# Patient Record
Sex: MALE | Race: WHITE | NOT HISPANIC OR LATINO | Employment: FULL TIME | URBAN - METROPOLITAN AREA
[De-identification: names, ages, dates, MRNs, and addresses within clinical notes are randomized per-mention and may not be internally consistent; named-entity substitution may affect disease eponyms.]

---

## 2018-11-26 ENCOUNTER — OFFICE VISIT - HEALTHEAST (OUTPATIENT)
Dept: FAMILY MEDICINE | Facility: CLINIC | Age: 33
End: 2018-11-26

## 2018-11-26 DIAGNOSIS — Z00.00 ROUTINE GENERAL MEDICAL EXAMINATION AT A HEALTH CARE FACILITY: ICD-10-CM

## 2018-11-26 LAB
ALBUMIN SERPL-MCNC: 4.6 G/DL (ref 3.5–5)
ALP SERPL-CCNC: 64 U/L (ref 45–120)
ALT SERPL W P-5'-P-CCNC: 32 U/L (ref 0–45)
ANION GAP SERPL CALCULATED.3IONS-SCNC: 12 MMOL/L (ref 5–18)
AST SERPL W P-5'-P-CCNC: 29 U/L (ref 0–40)
BILIRUB SERPL-MCNC: 0.9 MG/DL (ref 0–1)
BUN SERPL-MCNC: 11 MG/DL (ref 8–22)
CALCIUM SERPL-MCNC: 10 MG/DL (ref 8.5–10.5)
CHLORIDE BLD-SCNC: 102 MMOL/L (ref 98–107)
CHOLEST SERPL-MCNC: 179 MG/DL
CO2 SERPL-SCNC: 27 MMOL/L (ref 22–31)
CREAT SERPL-MCNC: 0.84 MG/DL (ref 0.7–1.3)
ERYTHROCYTE [DISTWIDTH] IN BLOOD BY AUTOMATED COUNT: 11.1 % (ref 11–14.5)
FASTING STATUS PATIENT QL REPORTED: YES
GFR SERPL CREATININE-BSD FRML MDRD: >60 ML/MIN/1.73M2
GLUCOSE BLD-MCNC: 81 MG/DL (ref 70–125)
HCT VFR BLD AUTO: 44 % (ref 40–54)
HDLC SERPL-MCNC: 51 MG/DL
HGB BLD-MCNC: 14.9 G/DL (ref 14–18)
LDLC SERPL CALC-MCNC: 115 MG/DL
MCH RBC QN AUTO: 30.3 PG (ref 27–34)
MCHC RBC AUTO-ENTMCNC: 33.9 G/DL (ref 32–36)
MCV RBC AUTO: 89 FL (ref 80–100)
PLATELET # BLD AUTO: 228 THOU/UL (ref 140–440)
PMV BLD AUTO: 7.6 FL (ref 7–10)
POTASSIUM BLD-SCNC: 3.7 MMOL/L (ref 3.5–5)
PROT SERPL-MCNC: 7.8 G/DL (ref 6–8)
RBC # BLD AUTO: 4.92 MILL/UL (ref 4.4–6.2)
SODIUM SERPL-SCNC: 141 MMOL/L (ref 136–145)
TRIGL SERPL-MCNC: 67 MG/DL
WBC: 7.5 THOU/UL (ref 4–11)

## 2018-11-26 ASSESSMENT — MIFFLIN-ST. JEOR: SCORE: 1662.67

## 2018-11-27 LAB
HBV SURFACE AB SERPL IA-ACNC: POSITIVE M[IU]/ML
RUBV IGG SERPL QL IA: POSITIVE

## 2018-11-28 LAB
GAMMA INTERFERON BACKGROUND BLD IA-ACNC: 0.06 IU/ML
M TB IFN-G BLD-IMP: NEGATIVE
MEV IGG SER IA-ACNC: POSITIVE
MITOGEN IGNF BCKGRD COR BLD-ACNC: -0.01 IU/ML
MITOGEN IGNF BCKGRD COR BLD-ACNC: -0.01 IU/ML
MUV IGG SER QL IA: POSITIVE
QTF INTERPRETATION: NORMAL
QTF MITOGEN - NIL: 9.13 IU/ML
VZV IGG SER QL IA: POSITIVE

## 2018-12-13 ENCOUNTER — RECORDS - HEALTHEAST (OUTPATIENT)
Dept: ADMINISTRATIVE | Facility: OTHER | Age: 33
End: 2018-12-13

## 2019-11-04 ENCOUNTER — RECORDS - HEALTHEAST (OUTPATIENT)
Dept: ADMINISTRATIVE | Facility: OTHER | Age: 34
End: 2019-11-04

## 2021-03-02 ENCOUNTER — OFFICE VISIT - HEALTHEAST (OUTPATIENT)
Dept: FAMILY MEDICINE | Facility: CLINIC | Age: 36
End: 2021-03-02

## 2021-03-02 DIAGNOSIS — Z00.00 ROUTINE GENERAL MEDICAL EXAMINATION AT A HEALTH CARE FACILITY: ICD-10-CM

## 2021-03-02 LAB
ERYTHROCYTE [DISTWIDTH] IN BLOOD BY AUTOMATED COUNT: 12 % (ref 11–14.5)
HCT VFR BLD AUTO: 45.3 % (ref 40–54)
HGB BLD-MCNC: 15.7 G/DL (ref 14–18)
MCH RBC QN AUTO: 29.9 PG (ref 27–34)
MCHC RBC AUTO-ENTMCNC: 34.7 G/DL (ref 32–36)
MCV RBC AUTO: 86 FL (ref 80–100)
PLATELET # BLD AUTO: 229 THOU/UL (ref 140–440)
PMV BLD AUTO: 9.7 FL (ref 7–10)
RBC # BLD AUTO: 5.25 MILL/UL (ref 4.4–6.2)
WBC: 7.7 THOU/UL (ref 4–11)

## 2021-03-02 ASSESSMENT — MIFFLIN-ST. JEOR: SCORE: 1658.14

## 2021-03-03 LAB
ALBUMIN SERPL-MCNC: 5 G/DL (ref 3.5–5)
ALP SERPL-CCNC: 54 U/L (ref 45–120)
ALT SERPL W P-5'-P-CCNC: 24 U/L (ref 0–45)
ANION GAP SERPL CALCULATED.3IONS-SCNC: 12 MMOL/L (ref 5–18)
AST SERPL W P-5'-P-CCNC: 25 U/L (ref 0–40)
BILIRUB SERPL-MCNC: 1 MG/DL (ref 0–1)
BUN SERPL-MCNC: 13 MG/DL (ref 8–22)
CALCIUM SERPL-MCNC: 9.4 MG/DL (ref 8.5–10.5)
CHLORIDE BLD-SCNC: 103 MMOL/L (ref 98–107)
CHOLEST SERPL-MCNC: 173 MG/DL
CO2 SERPL-SCNC: 25 MMOL/L (ref 22–31)
CREAT SERPL-MCNC: 0.89 MG/DL (ref 0.7–1.3)
FASTING STATUS PATIENT QL REPORTED: YES
GFR SERPL CREATININE-BSD FRML MDRD: >60 ML/MIN/1.73M2
GLUCOSE BLD-MCNC: 82 MG/DL (ref 70–125)
HDLC SERPL-MCNC: 55 MG/DL
LDLC SERPL CALC-MCNC: 105 MG/DL
POTASSIUM BLD-SCNC: 3.7 MMOL/L (ref 3.5–5)
PROT SERPL-MCNC: 8.2 G/DL (ref 6–8)
SODIUM SERPL-SCNC: 140 MMOL/L (ref 136–145)
TRIGL SERPL-MCNC: 65 MG/DL

## 2021-03-05 LAB
GAMMA INTERFERON BACKGROUND BLD IA-ACNC: 0.09 IU/ML
M TB IFN-G BLD-IMP: NEGATIVE
MITOGEN IGNF BCKGRD COR BLD-ACNC: -0.01 IU/ML
MITOGEN IGNF BCKGRD COR BLD-ACNC: 0.03 IU/ML
QTF INTERPRETATION: NORMAL
QTF MITOGEN - NIL: 7.92 IU/ML

## 2021-05-29 ENCOUNTER — RECORDS - HEALTHEAST (OUTPATIENT)
Dept: ADMINISTRATIVE | Facility: CLINIC | Age: 36
End: 2021-05-29

## 2021-06-02 VITALS — HEIGHT: 70 IN | WEIGHT: 163 LBS | BODY MASS INDEX: 23.34 KG/M2

## 2021-06-05 VITALS
SYSTOLIC BLOOD PRESSURE: 139 MMHG | WEIGHT: 162 LBS | HEIGHT: 70 IN | RESPIRATION RATE: 16 BRPM | DIASTOLIC BLOOD PRESSURE: 82 MMHG | HEART RATE: 82 BPM | BODY MASS INDEX: 23.19 KG/M2

## 2021-06-09 ENCOUNTER — COMMUNICATION - HEALTHEAST (OUTPATIENT)
Dept: FAMILY MEDICINE | Facility: CLINIC | Age: 36
End: 2021-06-09
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
## 2021-06-15 NOTE — PROGRESS NOTES
Assessment/Plan   1. Routine general medical examination at a health care facility  Minesh presents for his annual exam.  He is overall very healthy without major medical problems.  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  We will do fasting blood work.  Referral number given for Dr. Loving at the [facility] for vasectomy consult.  His father was diagnosed with colon cancer at age 62.  Would recommend begin screening at age 43.  - QTF-Mycobacterium tuberculosis by QuantiFERON-TB Gold Plus  - Comprehensive Metabolic Panel  - HM2(CBC w/o Differential)  - Lipid Cascade    Subjective:      Elier Huff is a 35 y.o. male who presents for an annual exam.  Overall, he states he is doing well.  It has been a couple years since I have seen him.  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  He now has 3 young children.  He is getting plenty of exercise.  Family history is updated with his father being diagnosed with colon cancer at age 62.  He is wanting to pursue a vasectomy.    Healthy Habits:   Regular Exercise: Yes  Sunscreen Use: Yes  Healthy Diet: Yes  Dental Visits Regularly: Yes  Seat Belt: Yes  Sexually active: Yes  Monthly Self Testicular Exams:  Yes  Hemoccults: No  Flex Sig: No  Colonoscopy: No  Lipid Profile: Yes  Glucose Screen: Yes  Prevention of Osteoporosis: Yes  Last Dexa: No      Immunization History   Administered Date(s) Administered     COVID-19,PF,Pfizer 01/13/2021, 01/13/2021, 02/03/2021     Influenza, inj, historic,unspecified 11/05/2014, 10/21/2015     Influenza,seasonal,quad inj =/> 6months 11/26/2018     Tdap 06/16/2011, 03/02/2021     Immunization status: due today.    No exam data present    Current Outpatient Medications   Medication Sig Dispense Refill     cholecalciferol, vitamin D3, (VITAMIN D3 ORAL) Take by mouth.       multivitamin capsule Take 1 capsule by mouth daily.       No current facility-administered medications for this visit.      No  past medical history on file.  Past Surgical History:   Procedure Laterality Date     [place] DENT[place] SURGERY PROCEDURE      Description: Oral Surgery Tooth Extraction;  Recorded: 11/26/2014;  Comments: wisdom teeth     Patient has no known allergies.  Family History   Problem Relation Age of Onset     Hypertension Brother      Social History     Socioeconomic History     Marital status:      Spouse name: Not on file     Number of children: Not on file     Years of education: Not on file     Highest education level: Not on file   Occupational History     Not on file   Social Needs     Financial resource strain: Not on file     Food insecurity     Worry: Not on file     Inability: Not on file     Transportation needs     Medical: Not on file     Non-medical: Not on file   Tobacco Use     Smoking status: Never Smoker     Smokeless tobacco: Never Used   Substance and Sexual Activity     Alcohol use: Not on file     Drug use: Not on file     Sexual activity: Not on file   Lifestyle     Physical activity     Days per week: Not on file     Minutes per session: Not on file     Stress: Not on file   Relationships     Social connections     Talks on phone: Not on file     Gets together: Not on file     Attends Holiness service: Not on file     Active member of club or organization: Not on file     Attends meetings of clubs or organizations: Not on file     Relationship status: Not on file     Intimate partner violence     Fear of current or ex partner: Not on file     Emotionally abused: Not on file     Physically abused: Not on file     Forced sexual activity: Not on file   Other Topics Concern     Not on file   Social History Narrative     Not on file       Review of Systems  General:  Denies problem  Eyes: Denies problem  Ears/Nose/Throat: Denies problem  Cardiovascular: Denies problem  Respiratory:  Denies problem  Gastrointestinal:  Denies problem  Genitourinary: Denies problem  Musculoskeletal:  Denies problem  Skin:  "Denies problem  Neurologic: Denies problem  Psychiatric: Denies problem  Endocrine: Denies problem  Heme/Lymphatic: Denies problem   Allergic/Immunologic: Denies problem        Objective:     Vitals:    03/02/21 1711   BP: 139/82   Pulse: 82   Resp: 16   Weight: 162 lb (73.5 kg)   Height: 5' 9.5\" (1.765 m)     Body mass index is 23.58 kg/m .    Physical  General Appearance: Alert, cooperative, no distress, appears stated age  Head: Normocephalic, without obvious abnormality, atraumatic  Eyes: PERRL, conjunctiva/corneas clear, EOM's intact  Ears: Normal TM's and external ear canals, both ears  Nose: Nares normal, septum midline,mucosa normal, no drainage  Throat: Lips, mucosa, and tongue normal; teeth and gums normal  Neck: Supple, symmetrical, trachea midline, no adenopathy;  thyroid: not enlarged, symmetric, no tenderness/mass/nodules; no carotid bruit or JVD  Back: Symmetric, no curvature, ROM normal, no CVA tenderness  Lungs: Clear to auscultation bilaterally, respirations unlabored  Heart: Regular rate and rhythm, S1 and S2 normal, no murmur, rub, or gallop,  Abdomen: Soft, non-tender, bowel sounds active all four quadrants,  no masses, no organomegaly  Musculoskeletal: Normal range of motion. No joint swelling or deformity.   Extremities: Extremities normal, atraumatic, no cyanosis or edema  Skin: Skin color, texture, turgor normal, no rashes or lesions  Lymph nodes: Cervical, supraclavicular, and axillary nodes normal  Neurologic: He is alert. He has normal reflexes.   Psychiatric: He has a normal mood and affect.            "

## 2021-06-21 NOTE — PROGRESS NOTES
Assessment:   1. Routine general medical examination at a health care facility  As far as healthcare maintenance, he is up-to-date on his immunizations.  He would like to do fasting blood work today.  Flu shot is updated.  He needed titers done for his work credentialing.  Recommend next physical in 2 years.  - Hepatitis B Surface Antibody (Anti-HBs) Vaccine Check  - Mumps Antibody, IgG  - QTF-Mycobacterium tuberculosis by QuantiFERON-TB Gold Plus  - Rubella Antibody, IgG  - Rubeola Antibody, IgG  - Varicella Zoster Antibody, IgG  - Comprehensive Metabolic Panel  - HM2(CBC w/o Differential)  - Lipid Cascade  Subjective:      Elier Huff is a 33 y.o. male who presents for an annual exam.  Overall, he states he is feeling really well.  He does not have any physical complaints.  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  He is up-to-date on his tetanus.  We will update his flu shot today.  He tries to get regular exercise.  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  He has a 3-year-old daughter and a [id number] son.  They may be pregnant with another child soon.  He is a non-smoker.    Healthy Habits:   Regular Exercise: Yes  Sunscreen Use: Yes  Healthy Diet: Yes  Dental Visits Regularly: Yes  Seat Belt: Yes  Sexually active: Yes  Monthly Self Testicular Exams:  Yes  Hemoccults: No  Flex Sig: No  Colonoscopy: No  Lipid Profile: Yes  Glucose Screen: Yes  Prevention of Osteoporosis: Multi - vit  Last Dexa: No      Immunization History   Administered Date(s) Administered     Influenza, inj, historic,unspecified 11/05/2014, 10/21/2015     Tdap 06/16/2011     Immunization status: up to date and documented.    No exam data present     Current Outpatient Medications   Medication Sig Dispense Refill     multivitamin capsule Take 1 capsule by mouth daily.       No current facility-administered medications for this visit.      No past medical  "history on file.  Past Surgical History:   Procedure Laterality Date     [place] DENTAL SURGERY PROCEDURE      Description: Oral Surgery Tooth Extraction;  Recorded: 11/26/2014;  Comments: wisdom teeth     Patient has no known allergies.  Family History   Problem Relation Age of Onset     Hypertension Brother      Social History     Socioeconomic History     Marital status:      Spouse name: Not on file     Number of children: Not on file     Years of education: Not on file     Highest education level: Not on file   Social Needs     Financial resource strain: Not on file     Food insecurity - worry: Not on file     Food insecurity - inability: Not on file     Transportation needs - medical: Not on file     Transportation needs - non-medical: Not on file   Occupational History     Not on file   Tobacco Use     Smoking status: Never Smoker     Smokeless tobacco: Never Used   Substance and Sexual Activity     Alcohol use: Not on file     Drug use: Not on file     Sexual activity: Not on file   Other Topics Concern     Not on file   Social History Narrative     Not on file       Review of Systems  Review of Systems          Objective:     Vitals:    11/26/18 1409   BP: 122/80   Pulse: 68   Resp: 16   Temp: 97.7  F (36.5  C)   Weight: 163 lb (73.9 kg)   Height: 5' 9.5\" (1.765 m)     Body mass index is 23.73 kg/m .    Physical  Physical Exam      Physical Exam:  General Appearance: Alert, cooperative, no distress, appears stated age  Head: Normocephalic, without obvious abnormality, atraumatic  Eyes: PERRL, conjunctiva/corneas clear, EOM's intact  Ears: Normal TM's and external ear canals, both ears  Nose: Nares normal, septum midline,mucosa normal, no drainage  Throat: Lips, mucosa, and tongue normal; teeth and gums normal  Neck: Supple, symmetrical, trachea midline, no adenopathy; thyroid: not enlarged, symmetric, no tenderness/mass/nodules; no carotid bruit  Back: Symmetric, no curvature, ROM normal, no CVA " tenderness  Lungs: Clear to auscultation bilaterally, respirations unlabored  Heart: Regular rate and rhythm, S1 and S2 normal, no murmur, rub, or gallop, Abdomen: Soft, non-tender, bowel sounds active all four quadrants,  no masses, no organomegaly palpation.  No adnexal mass or tenderness.  : normal circumcised penis, testes descended bilaterally, no masses palpated, no hernias.  Extremities: Extremities normal, atraumatic, no cyanosis or edema  Skin: Skin color, texture, turgor normal, no rashes or lesions  Lymph nodes: Cervical, supraclavicular, and axillary nodes normal  Neurologic: Normal

## 2021-06-27 ENCOUNTER — HEALTH MAINTENANCE LETTER (OUTPATIENT)
Age: 36
End: 2021-06-27

## 2021-10-17 ENCOUNTER — HEALTH MAINTENANCE LETTER (OUTPATIENT)
Age: 36
End: 2021-10-17

## 2022-04-03 ENCOUNTER — HEALTH MAINTENANCE LETTER (OUTPATIENT)
Age: 37
End: 2022-04-03

## 2022-04-20 ENCOUNTER — OFFICE VISIT (OUTPATIENT)
Dept: FAMILY MEDICINE | Facility: CLINIC | Age: 37
End: 2022-04-20
Payer: COMMERCIAL

## 2022-04-20 VITALS
WEIGHT: 169 LBS | BODY MASS INDEX: 24.6 KG/M2 | HEART RATE: 82 BPM | OXYGEN SATURATION: 98 % | DIASTOLIC BLOOD PRESSURE: 78 MMHG | SYSTOLIC BLOOD PRESSURE: 120 MMHG

## 2022-04-20 DIAGNOSIS — Z30.09 ENCOUNTER FOR VASECTOMY COUNSELING: Primary | ICD-10-CM

## 2022-04-20 PROCEDURE — 99213 OFFICE O/P EST LOW 20 MIN: CPT | Performed by: FAMILY MEDICINE

## 2022-04-20 NOTE — PROGRESS NOTES
Assessment/Plan:    Encounter for vasectomy counseling  Contraception counseling reviewed.  Previous acne literature was provided.  Risk benefits alternatives to vasectomy discussed.  Consent form reviewed and signed by patient.  Patient will schedule for vasectomy at his convenience likely May 6, 2022 to arrive at noon.        Subjective:    Elier Huff is seen today for contraception counseling.  .  3 children.  No personal or family history of bleeding disorder anesthetic reaction etc.  Patient is likely had wisdom teeth extraction in the past otherwise no surgeries.  Continuing to do well without recent illness.  Patient desires permanent sterilization.  Comprehensive review of systems as above otherwise all negative.     - Caroline x 2016  3 children - 2 daughters (6 and 5) and 1 son (3)  Tobacco:  none  EtOH:  occ  Mom -   Dad - colon CA (dxd age 62)  4 siblings - 1 older sis (h/o colon polyp), 1 older bro, and 2 younger bros  Surgeries:  wisdom teeth  Hospitalizations:  none  Work:  [employer] (father started the business 30 years ago and now 4 sons will run it)  Hobbies:  hunting and fishing, 4 wheeling, hanging out with the kids    Past Surgical History:   Procedure Laterality Date     [place] DENTAL SURGERY PROCEDURE      Description: Oral Surgery Tooth Extraction;  Recorded: 11/26/2014;  Comments: wisdom teeth        Family History   Problem Relation Age of Onset     Hypertension Brother         No past medical history on file.     Social History     Tobacco Use     Smoking status: Never Smoker     Smokeless tobacco: Never Used        Current Outpatient Medications   Medication Sig Dispense Refill     cholecalciferol, vitamin D3, (VITAMIN D3 ORAL) [CHOLECALCIFEROL, VITAMIN D3, (VITAMIN D3 ORAL)] Take by mouth.       multivitamin capsule [MULTIVITAMIN CAPSULE] Take 1 capsule by mouth daily.            Objective:    Vitals:    04/20/22 1437   BP: 120/78   Pulse: 82   SpO2: 98%   Weight: 76.7 kg  (169 lb)      Body mass index is 24.6 kg/m .    Alert.  No apparent distress.  Genitalia circumcised male.  Testes descended bilaterally.  No hydrocele or varicocele.  Palpable vas deferens noted.  Extremities warm and dry.      This note has been dictated using voice recognition software and as a result may contain minor grammatical errors and unintended word substitutions.

## 2022-05-06 ENCOUNTER — OFFICE VISIT (OUTPATIENT)
Dept: FAMILY MEDICINE | Facility: CLINIC | Age: 37
End: 2022-05-06
Payer: COMMERCIAL

## 2022-05-06 VITALS
WEIGHT: 169 LBS | OXYGEN SATURATION: 97 % | HEART RATE: 77 BPM | SYSTOLIC BLOOD PRESSURE: 130 MMHG | DIASTOLIC BLOOD PRESSURE: 80 MMHG | BODY MASS INDEX: 24.6 KG/M2

## 2022-05-06 DIAGNOSIS — Z30.2 ENCOUNTER FOR VASECTOMY: Primary | ICD-10-CM

## 2022-05-06 PROCEDURE — 88302 TISSUE EXAM BY PATHOLOGIST: CPT | Performed by: PATHOLOGY

## 2022-05-06 PROCEDURE — 55250 REMOVAL OF SPERM DUCT(S): CPT | Performed by: FAMILY MEDICINE

## 2022-05-06 NOTE — PROGRESS NOTES
Vasectomy Procedure Note    Indications: 36 year old male desiring permanent sterilization    Pre-operative Diagnosis: Undesired fertility    Post-operative Diagnosis: Undesired fertility    Anesthesia: 1% lidocaine, 3 ml    Procedure Details:    Elier Huff  is seen today for scheduled vasectomy.  Patient desires permanent sterilization.  Consent form previously reviewed and signed by patient.   Consent form reviewed prior to procedure with physician statement signed.   Patient elects to continue with scheduled procedure.     - Caroline x 2016   3 children - 2 daughters (6 and 5) and 1 son (3)   Tobacco:  none   EtOH:  occ   Mom -   Dad - colon CA (dxd age 62)   4 siblings - 1 older sis (h/o colon polyp), 1 older bro, and 2 younger bros   Surgeries:  wisdom teeth   Hospitalizations:  none   Work:  [employer] (father started the business 30 years ago and now 4 sons will run it)   Hobbies:  hunting and fishing, 4 wheeling, hanging out with the kids    Elier Huff was prepped and draped in usual sterile fashion.  Right vas deferens isolated subcutaneously.  1% lidocaine injected superficially then to vas deferens.  15 blade incision performed.  Curved hemostat for blunt dissection.  Towel clip for vas deferens isolation.  Vas deferens sheath removed exposing normal-appearing vas deferens.  4-O chromic suture both proximal and distal segment of vas deferens with central portion excised for pathology.  Electrocautery of vas deferens ends performed.  Distal end then replacing into fascia with 5-0 chromic suture.  Good hemostasis noted.  Vas deferens then replacing into scrotum.  Single 4-0 chromic suture for skin incision closure.    Left vas deferens then isolated in a similar fashion.  1% lidocaine injected superficially and then to level of vas deferens and surrounding tissue.  15 blade incision performed.  Curved hemostat for blunt dissection.  Towel clip for vas deferens isolation.  Vas deferens sheath  removed exposing normal-appearing vas deferens.  4-0 chromic suture both proximal and distal segment of vas deferens with central portion excised for pathology.  Electrocautery of vas deferens ends performed.  Distal and replaced in the fascia with 5-0 chromic suture.  Good hemostasis noted.  Vas deferens then replaced into scrotum.  Single 4-0 chromic for skin incision closure.  Triple antibiotic with 4 x 4 gauze pads placed at bilateral incision sites.      Specimen: vas segment, bilateral    Condition: Stable    Complications: none    Plan:  1. Continue contraception until negative sperm analysis. Semen count after 20-25 ejaculations and 12 weeks s/p vasectomy.  2. Warning signs of infection were reviewed.   3. Written home care instructions provided.  Backup contraception until confirmed sterility.  May resume normal bathing after 24 hours.  Avoid strenuous activity times one week.  Ibuprofen or Tylenol OTC for pain management.  Notify with increased swelling, bleeding, or drainage.  Postvasectomy semen analysis in 12 weeks after 20-25 ejaculations to confirm desired sterility.  Call the clinic if excessive pain, bleeding or swelling.

## 2022-05-10 LAB
PATH REPORT.COMMENTS IMP SPEC: NORMAL
PATH REPORT.COMMENTS IMP SPEC: NORMAL
PATH REPORT.FINAL DX SPEC: NORMAL
PATH REPORT.GROSS SPEC: NORMAL
PATH REPORT.MICROSCOPIC SPEC OTHER STN: NORMAL
PATH REPORT.RELEVANT HX SPEC: NORMAL
PHOTO IMAGE: NORMAL

## 2022-07-09 ENCOUNTER — HOSPITAL ENCOUNTER (EMERGENCY)
Facility: HOSPITAL | Age: 37
Discharge: HOME | End: 2022-07-09
Attending: EMERGENCY MEDICINE | Admitting: EMERGENCY MEDICINE
Payer: COMMERCIAL

## 2022-07-09 ENCOUNTER — NURSE TRIAGE (OUTPATIENT)
Dept: NURSING | Facility: CLINIC | Age: 37
End: 2022-07-09

## 2022-07-09 VITALS
DIASTOLIC BLOOD PRESSURE: 86 MMHG | HEIGHT: 70 IN | BODY MASS INDEX: 24.28 KG/M2 | TEMPERATURE: 97.8 F | WEIGHT: 169.6 LBS | OXYGEN SATURATION: 98 % | HEART RATE: 93 BPM | SYSTOLIC BLOOD PRESSURE: 137 MMHG | RESPIRATION RATE: 20 BRPM

## 2022-07-09 DIAGNOSIS — T78.40XA ALLERGIC REACTION, INITIAL ENCOUNTER: ICD-10-CM

## 2022-07-09 PROCEDURE — 250N000011 HC RX IP 250 OP 636: Performed by: EMERGENCY MEDICINE

## 2022-07-09 PROCEDURE — 250N000013 HC RX MED GY IP 250 OP 250 PS 637: Performed by: EMERGENCY MEDICINE

## 2022-07-09 PROCEDURE — 99283 EMERGENCY DEPT VISIT LOW MDM: CPT

## 2022-07-09 RX ORDER — FAMOTIDINE 20 MG/1
20 TABLET, FILM COATED ORAL 2 TIMES DAILY PRN
Qty: 20 TABLET | Refills: 0 | Status: SHIPPED | OUTPATIENT
Start: 2022-07-09 | End: 2022-07-14

## 2022-07-09 RX ORDER — DEXAMETHASONE 4 MG/1
4 TABLET ORAL ONCE
Status: COMPLETED | OUTPATIENT
Start: 2022-07-10 | End: 2022-07-09

## 2022-07-09 RX ORDER — FAMOTIDINE 20 MG/1
40 TABLET, FILM COATED ORAL ONCE
Status: COMPLETED | OUTPATIENT
Start: 2022-07-10 | End: 2022-07-09

## 2022-07-09 RX ORDER — EPINEPHRINE 0.3 MG/.3ML
0.3 INJECTION SUBCUTANEOUS
Qty: 2 EACH | Refills: 0 | Status: SHIPPED | OUTPATIENT
Start: 2022-07-09

## 2022-07-09 RX ORDER — PREDNISONE 10 MG/1
TABLET ORAL
Qty: 7 TABLET | Refills: 0 | Status: SHIPPED | OUTPATIENT
Start: 2022-07-09 | End: 2022-07-15

## 2022-07-09 RX ADMIN — FAMOTIDINE 40 MG: 20 TABLET ORAL at 23:34

## 2022-07-09 RX ADMIN — DEXAMETHASONE 4 MG: 4 TABLET ORAL at 23:34

## 2022-07-09 ASSESSMENT — ENCOUNTER SYMPTOMS
COLOR CHANGE: 1
FEVER: 1
SHORTNESS OF BREATH: 0
TROUBLE SWALLOWING: 0
ROS SKIN COMMENTS: POSITIVE FOR HIVES
ABDOMINAL PAIN: 0

## 2022-07-10 NOTE — ED PROVIDER NOTES
EMERGENCY DEPARTMENT ENCOUNTER       ED Course & Medical Decision Making     11:22 PM I met the patient and performed my initial interview and exam.   11:41 PM We discussed the plan for discharge and the patient is agreeable. Reviewed supportive cares, symptomatic treatment, outpatient follow up, and reasons to return to the Emergency Department. All questions and concerns were addressed. Patient to be discharged by ED RN.      I saw and examined the patient.  There is erythema around the site of the sting but no stinger present.  Some urticaria up his left flank.  No signs or symptoms of leeanna anaphylaxis.    He is ready had Benadryl and I do feel that adding on some famotidine and prednisone would be indicated.    Given how long this has been going on already and the fact that it is improving he did not want to stay for an observation period and I think that is reasonable.    He does report that with every bee sting that he has had recently they have been getting progressively worse and for that reason I instructed him on use and provided prescription for EpiPen    Plan is to have him follow-up with his regular physician.    Prior to making a final disposition on this patient the results of patient's tests and other diagnostic studies were discussed with the patient. All questions were answered. Patient expressed understanding of the plan and was amenable to it.    Medications   famotidine (PEPCID) tablet 40 mg (40 mg Oral Given 7/9/22 2334)   dexamethasone (DECADRON) tablet 4 mg (4 mg Oral Given 7/9/22 2334)       Final Impression     1. Allergic reaction, initial encounter            Chief Complaint     Chief Complaint   Patient presents with     Insect Bite     Rash       Patient arrives to triage from home with chief complaint of bee sting around 1130/1200 earlier today.  Patient has redness and tenderness to right calf and scattered hives on left side of stomach.  Reports lips tingling earlier but has  improved since he took Benadryl around 2200.  Took his Allegra as well.  No shortness of breath, no swallowing difficulties, airway intact.  Alert and oriented x4.  Did report having a temperature of 101 after shower, didn't take Tylenol.          HPI       Elier Huff is a 36 year old male who presents for evaluation of of bee sting    At 2:30 PM this afternoon, the patient was stung by a bee in his left calf as he was moving some logs with his fourwheeler. The patient notes he iced the area right after but developed swelling and redness around the sing and hives on the left side of his stomach.  He also endorses lip tingling earlier but improved after taking 2 Benadryl tablets at 10 PM.  The patient reports having a fever at 101 after showering. He denies any shortness of breath, swallowing difficulties, chest pain, abdominal pain, or any other complaints at this time.     The patient has been stung by bees before, however, the patient reports that every time his reaction gets worse. The patient's father is also allergic to bees.     IFina am serving as a scribe to document services personally performed by Juan Case M.D. based on my observation and the provider's statements to me. IJuan M.D attest that Fina Hendrix is acting in a scribe capacity, has observed my performance of the services and has documented them in accordance with my direction.    Past Medical History     No past medical history on file.  Past Surgical History:   Procedure Laterality Date     [place] DENTAL SURGERY PROCEDURE      Description: Oral Surgery Tooth Extraction;  Recorded: 11/26/2014;  Comments: wisdom teeth     Family History   Problem Relation Age of Onset     Hypertension Brother       Social History     Tobacco Use     Smoking status: Never Smoker     Smokeless tobacco: Never Used       Relevant past medical, surgical, family and social history as documented above, has been reviewed and discussed with  "patient. No changes or additions, unless otherwise noted in the HPI.    Current Medications     EPINEPHrine (ANY BX GENERIC EQUIV) 0.3 MG/0.3ML injection 2-pack  famotidine (PEPCID) 20 MG tablet  predniSONE (DELTASONE) 10 MG tablet  cholecalciferol, vitamin D3, (VITAMIN D3 ORAL)  multivitamin capsule        Allergies     No Known Allergies    Review of Systems     Review of Systems   Constitutional: Positive for fever (101).   HENT: Negative for trouble swallowing.         Positive for lip tingling   Respiratory: Negative for shortness of breath.    Cardiovascular: Positive for leg swelling (left calf - bee sting). Negative for chest pain.   Gastrointestinal: Negative for abdominal pain.   Skin: Positive for color change (red).        Positive for hives   All other systems reviewed and are negative.       Remainder of systems reviewed, unless noted in HPI all others negative.    Physical Exam     /86   Pulse 93   Temp 97.8  F (36.6  C) (Oral)   Resp 20   Ht 1.778 m (5' 10\")   Wt 76.9 kg (169 lb 9.6 oz)   SpO2 98%   BMI 24.34 kg/m      Physical Exam  Vitals and nursing note reviewed.   Constitutional:       General: He is not in acute distress.  HENT:      Head: Normocephalic.      Nose: Nose normal.   Eyes:      General: No scleral icterus.  Cardiovascular:      Rate and Rhythm: Normal rate.   Pulmonary:      Effort: Pulmonary effort is normal.      Breath sounds: No wheezing.   Musculoskeletal:      Cervical back: Neck supple.   Skin:     Comments: 8 cm patch of erythema and induration on his left calf where he was stung.  Urticarial rash on his trunk more primarily on the left side   Neurological:      Mental Status: He is alert. Mental status is at baseline.   Psychiatric:         Mood and Affect: Mood normal.             Labs & Imaging         Labs Ordered and Resulted from Time of ED Arrival to Time of ED Departure - No data to display           Juan Case MD  Emergency Medicine  [facility] " [facility] EMERGENCY DEPARTMENT  [facility] [facility] [id number]  [phone]  7/9/2022       Juan Case MD  07/10/22 0220

## 2022-07-10 NOTE — ED TRIAGE NOTES
Patient arrives to triage from home with chief complaint of bee sting around 1130/1200 earlier today.  Patient has redness and tenderness to right calf and scattered hives on left side of stomach.  Reports lips tingling earlier but has improved since he took Benadryl around 2200.  Took his Allegra as well.  No shortness of breath, no swallowing difficulties, airway intact.  Alert and oriented x4.  Did report having a temperature of 101 after shower, didn't take Tylenol.

## 2022-07-10 NOTE — TELEPHONE ENCOUNTER
Nurse Triage SBAR    Is this a 2nd Level Triage? YES, LICENSED PRACTITIONER REVIEW IS REQUIRED    Situation: Patient calling regarding a bee sting that happened today at 11 am on his left calf.    Consent: not needed    Background:   Patient was stung by a bee at 11 am this morning on his left calf, patient states that every time he gets stung his symptoms get a little worse.   Assessment:   -Around 12-1pm patinet could feel bottom of lip felt tingling swollen feels minor currently   - Patients calf feels tight   -Red margin around sting- 4x3 inches  -Took shower and noticed a rash from belt line to left armpit (less on right)   -3/4 of inch or smaller sized hives (20-30 of them )  -Denies trouble breathing  -Denies chest pain   -Denies vomiting or abdominal cramps   - Site of sting is tender to touch   - 101 temperature tympanic      Protocol Recommended Disposition:   See HCP Within 4 Hours (Or PCP Triage)    Recommendation: Advised patient to await for nurse to speak with on call provider regarding further recommendations . Reviewed concerning symptoms and when to call back.     10:15 Dr. Harrell called and would like him to be evaluated in the ED given the extensive his symptoms and rash are.     10:20 patient contacted with Dr. Harrell recommendations to be evaluated in the nearest ED, patient states he just took benadryl and will go into [place].     Paged to provider     Provider Recommendation Follow Up:   Reached patient/caregiver. Informed of provider's recommendations. Patient verbalized understanding and agrees with the plan.         Jesus Luz RN [facility] 7/9/2022 10:23 PM        COVID 19 Nurse Triage Plan/Patient Instructions    Please be aware that novel coronavirus (COVID-19) may be circulating in the community. If you develop symptoms such as fever, cough, or SOB or if you have concerns about the presence of another infection including coronavirus (COVID-19), please  contact your health care provider or visit [email or web address].     Disposition/Instructions    ED Visit recommended. Follow protocol based instructions.     Bring Your Own Device:  Please also bring your smart device(s) (smart phones, tablets, laptops) and their charging cables for your personal use and to communicate with your care team during your visit.    Thank you for taking steps to prevent the spread of this virus.  o Limit your contact with others.  o Wear a simple mask to cover your cough.  o Wash your hands well and often.    Resources    [facility]: About COVID-19: [email or web address]    CDC: What to Do If You're Sick: www.cdc.gov/coronavirus/2019-ncov/about/steps-when-sick.html    CDC: Ending Home Isolation: www.cdc.gov/coronavirus/2019-ncov/hcp/disposition-in-home-patients.html     CDC: Caring for Someone: www.cdc.gov/coronavirus/2019-ncov/if-you-are-sick/care-for-someone.html     [facility]: Interim Guidance for Hospital Discharge to Home: www.[email or web address].[place]/diseases/coronavirus/hcp/hospdischarge.pdf    [facility] clinical trials (COVID-19 research studies): clinicalaffairs.[facility]-clinical-trials     Below are the COVID-19 hotlines at the [place] [facility] ([facility]). Interpreters are available.   o For health questions: Call 483-137-2479 or 1-525.323.9476 (7 a.m. to 7 p.m.)  o For questions about schools and childcare: Call 706-238-5521 or 1-282.864.7151 (7 a.m. to 7 p.m.)     Reason for Disposition    [1] Fever AND [2] red area    Additional Information    Negative: [1] Life-threatening reaction (anaphylaxis) in the past to sting AND [2] < 2 hours since sting    Negative: Attacked by swarm of bees now    Negative: Passed out (i.e., lost consciousness, collapsed and was not responding)    Negative: Wheezing, stridor, or difficulty breathing    Negative: [1] Hoarseness or cough AND [2] sudden onset following sting    Negative: [1] Tightness in the  throat or chest AND [2] sudden onset following sting    Negative: [1] Swollen tongue or difficulty swallowing AND [2] sudden onset following sting    Negative: Sounds like a life-threatening emergency to the triager    Negative: Not a bee, wasp, hornet, or yellow jacket sting    Negative: Ring stuck on swollen finger (or toe) following bee sting    Negative: [1] Hives, itching, or swelling elsewhere on body (i.e., not a site of sting) AND [2] started within 2 hours of sting (Exception: only at site of sting)    Negative: [1] Vomiting or abdominal cramps AND [2] started within 2 hours of sting    Negative: [1] Gave epinephrine shot AND [2] no symptoms now    Negative: Sting inside the mouth    Negative: Sting on eyeball (e.g., cornea)    Negative: Patient sounds very sick or weak to the triager    Negative: More than 50 stings    Protocols used: BEE OR YELLOW JACKET STING-A-

## 2022-07-28 ENCOUNTER — LAB (OUTPATIENT)
Dept: LAB | Facility: CLINIC | Age: 37
End: 2022-07-28
Payer: COMMERCIAL

## 2022-07-28 DIAGNOSIS — Z30.2 ENCOUNTER FOR VASECTOMY: ICD-10-CM

## 2022-07-28 LAB — SEMEN ANALYSIS P VAS PNL: NORMAL

## 2022-07-28 PROCEDURE — 89321 SEMEN ANAL SPERM DETECTION: CPT

## 2022-10-02 ENCOUNTER — HEALTH MAINTENANCE LETTER (OUTPATIENT)
Age: 37
End: 2022-10-02

## 2023-03-22 ENCOUNTER — NURSE TRIAGE (OUTPATIENT)
Dept: NURSING | Facility: CLINIC | Age: 38
End: 2023-03-22
Payer: COMMERCIAL

## 2023-03-22 ENCOUNTER — OFFICE VISIT (OUTPATIENT)
Dept: FAMILY MEDICINE | Facility: CLINIC | Age: 38
End: 2023-03-22
Payer: COMMERCIAL

## 2023-03-22 VITALS
WEIGHT: 168 LBS | SYSTOLIC BLOOD PRESSURE: 147 MMHG | TEMPERATURE: 97.6 F | HEART RATE: 71 BPM | RESPIRATION RATE: 12 BRPM | BODY MASS INDEX: 24.11 KG/M2 | DIASTOLIC BLOOD PRESSURE: 92 MMHG | OXYGEN SATURATION: 98 %

## 2023-03-22 DIAGNOSIS — R05.1 ACUTE COUGH: Primary | ICD-10-CM

## 2023-03-22 PROCEDURE — 99213 OFFICE O/P EST LOW 20 MIN: CPT | Performed by: FAMILY MEDICINE

## 2023-03-22 NOTE — PATIENT INSTRUCTIONS
Take antibiotics as prescribed.  Follow-up if symptoms are getting worse or not improving as expected for the next week.

## 2023-03-22 NOTE — TELEPHONE ENCOUNTER
Elier calling with a sore throat and dry nonproductive cough for one month and yesterday started feeling worse. Rates the pain 3. Denies difficulty breathing. Reports difficulty swallowing but is getting adequate fluids.  He wants to be seen today but no appt available, agreed to go to Urgent care in [place].  Gretta Latham RN on 3/22/2023 at 3:27 PM      Reason for Disposition    Patient wants to be seen    Additional Information    Negative: SEVERE difficulty breathing (e.g., struggling for each breath, speaks in single words)    Negative: Sounds like a life-threatening emergency to the triager    Negative: Drooling or spitting out saliva (because can't swallow)    Negative: Unable to open mouth completely    Negative: Drinking very little and has signs of dehydration (e.g., no urine > 12 hours, very dry mouth, very lightheaded)    Negative: Patient sounds very sick or weak to the triager    Negative: Difficulty breathing (per caller) but not severe    Negative: Fever > 103 F (39.4 C)    Negative: Refuses to drink anything for > 12 hours    Negative: SEVERE sore throat pain    Negative: Pus on tonsils (back of throat) and swollen neck lymph nodes ('glands')    Negative: Earache also present    Negative: Widespread rash (especially chest and abdomen)    Negative: Diabetes mellitus or weak immune system (e.g., HIV positive, cancer chemo, splenectomy, organ transplant, chronic steroids)    Negative: History of rheumatic fever    Protocols used: SORE THROAT-A-[place]

## 2023-03-22 NOTE — PROGRESS NOTES
Assessment:       Acute cough  - amoxicillin-clavulanate (AUGMENTIN) 875-125 MG tablet  Dispense: 14 tablet; Refill: 0         Plan:     Given the duration of patient's cough now almost a month as well as a sore throat for the past month did elect to give him a course of Augmentin to see if this will help with his symptoms.  Follow-up if symptoms getting worse or not improving with his PCP.    MEDICATIONS:   Orders Placed This Encounter   Medications     amoxicillin-clavulanate (AUGMENTIN) 875-125 MG tablet     Sig: Take 1 tablet by mouth 2 times daily for 7 days     Dispense:  14 tablet     Refill:  0         Patient Instructions   Take antibiotics as prescribed.  Follow-up if symptoms are getting worse or not improving as expected for the next week.      Subjective:       37 year old male presents for evaluation of a cough and sore throat that is been going on for almost a month.  He initially had some fevers and chills and this is since resolved but has had ongoing fatigue.  He has had 2 negative strep test and a negative COVID test.  He denies much nasal congestion.  He just cannot seem to shake the cough but the sore throat has continued to significantly bother him over the past month.  Feels like he has a lump in his throat.  No difficulty managing his secretions.    There is no problem list on file for this patient.      No past medical history on file.    Past Surgical History:   Procedure Laterality Date     [place] D[facility]G[facility] PROCEDURE      Description: Oral Surgery Tooth Extraction;  Recorded: 11/26/2014;  Comments: wisdom teeth       Current Outpatient Medications   Medication     amoxicillin-clavulanate (AUGMENTIN) 875-125 MG tablet     cholecalciferol, vitamin D3, (VITAMIN D3 ORAL)     EPINEPHrine (ANY BX GENERIC EQUIV) 0.3 MG/0.3ML injection 2-pack     multivitamin capsule     No current facility-administered medications for this visit.       No Known Allergies    Family History   Problem Relation Age  of Onset     Hypertension Brother        Social History     Socioeconomic History     Marital status:      Spouse name: None     Number of children: None     Years of education: None     Highest education level: None   Tobacco Use     Smoking status: Never     Smokeless tobacco: Never         Review of Systems  Pertinent items are noted in HPI.      Objective:                     General Appearance:    BP (!) 147/92 (BP Location: Right arm, Patient Position: Sitting, Cuff Size: Adult Regular)   Pulse 71   Temp 97.6  F (36.4  C) (Oral)   Resp 12   Wt 76.2 kg (168 lb)   SpO2 98%   BMI 24.11 kg/m          Alert, pleasant, cooperative, no distress, appears stated age   Head:    Normocephalic, without obvious abnormality, atraumatic   Eyes:    Conjunctiva/corneas clear   Ears:    Normal TM's without erythema or bulging. Normal external ear canals, both ears   Nose:   Nares normal, septum midline, mucosa normal, no drainage    or sinus tenderness   Throat:   Lips, mucosa, and tongue normal; teeth and gums normal.  No tonsilar hypertrophy or exudate.   Neck:   Supple, symmetrical, trachea midline, no adenopathy    Lungs:     Clear to auscultation bilaterally without wheezes, rales, or rhonchi, respirations unlabored    Heart:    Regular rate and rhythm, S1 and S2 normal, no murmur, rub or gallop       Extremities:   Extremities normal, atraumatic, no cyanosis or edema   Skin:   Skin color, texture, turgor normal, no rashes or lesions         This note has been dictated using voice recognition software. Any grammatical or context distortions are unintentional and inherent to the software

## 2025-05-08 ENCOUNTER — PATIENT OUTREACH (OUTPATIENT)
Dept: CARE COORDINATION | Facility: CLINIC | Age: 40
End: 2025-05-08
Payer: COMMERCIAL